# Patient Record
Sex: FEMALE | Race: WHITE | NOT HISPANIC OR LATINO
[De-identification: names, ages, dates, MRNs, and addresses within clinical notes are randomized per-mention and may not be internally consistent; named-entity substitution may affect disease eponyms.]

---

## 2019-03-06 ENCOUNTER — APPOINTMENT (OUTPATIENT)
Dept: NEUROSURGERY | Facility: CLINIC | Age: 69
End: 2019-03-06
Payer: COMMERCIAL

## 2019-03-06 VITALS
WEIGHT: 132 LBS | HEART RATE: 106 BPM | BODY MASS INDEX: 18.9 KG/M2 | SYSTOLIC BLOOD PRESSURE: 195 MMHG | DIASTOLIC BLOOD PRESSURE: 79 MMHG | HEIGHT: 70 IN

## 2019-03-06 PROCEDURE — 99204 OFFICE O/P NEW MOD 45 MIN: CPT

## 2019-03-06 NOTE — HISTORY OF PRESENT ILLNESS
[de-identified] : Waleska is a pleasant 68-year-old here for evaluation of left leg weakness. She states she's had back and leg pain for several months however is really noted that she is really unable to bear weight on the left leg for some time now. She relates with the assistance of a cane. She states that her left leg will buckle readily. She been worked up and undergone some pain management treatments along the way. She had an MRI of the lumbar spine performed medical arts radiology. She did get an injection to the spine which did not provide much relief. She saw Dr. Cain ulloa and had an EMG performed which showed severe lumbosacral radiculopathy without evidence of overt neuropathy. MRI and see her for my review. She has no complaints at that suggest cauda equina compression. Symptoms are almost all unilateral back and leg on the left down to the knee.She did  10 visits of physical therapy which she is trying to continue as her leg strength has been getting worse.

## 2019-03-06 NOTE — PLAN
[FreeTextEntry1] : This is a patient with severe left leg weakness with imaging the doesn't support a direct etiology. The grade 1 spondylolisthesis at L4-5 doesn't seem significant enough to cause his left leg to be atrophied and essentially we can to the point of being not even antigravity and power. She has significant quadriceps and iliopsoas weakness. MRI does not show stenosis at the upper levels and the narrowing at L4-5 level is also minimal. I'm concerned that there is a lumbar plexus issue or an issue more peripherally therefore recommended a pelvic MRI as well as CAT scan and flexion-extension views. There is highly unstable segment 45 which is not being seen on the MRI then this will be helpful to note. Her past for repeat MRI with contrast to rule out either intradural pathology or a primary neuropathology.  The question is whether or not this and the peripheral in the pelvis catching lumbar plexus outside of the spine.

## 2019-03-06 NOTE — RESULTS/DATA
[FreeTextEntry1] : MRI lumbar spine and medical arts shows a grade 1 spondylolisthesis which is about one or 2 mm at L4-5 there may be a spondylolysis. The rest of the lumbar spine is relatively unremarkable with mild changes only. There is no significant stenosis there is no large disc herniations noted. I can see that the left iliopsoas muscle there was significantly atrophied compared to the right.\par \par The EMG reveals significant changes that are suggestive of lumbosacral radiculopathy rather than neuropathy.

## 2019-03-06 NOTE — PHYSICAL EXAM
[5] : S1 flexor hallucis longus 5/5 [2] : L3 quadriceps 2/5 [3] : S1 flexor hallucis longus 3/5 [FreeTextEntry1] : Significant atrophy of the quadriceps on the LEFT

## 2019-03-06 NOTE — REASON FOR VISIT
[New Patient Visit] : a new patient visit [FreeTextEntry1] : Patient is here for Lower back pain.MRI and Emg done at [place].

## 2019-03-22 ENCOUNTER — APPOINTMENT (OUTPATIENT)
Dept: NEUROSURGERY | Facility: CLINIC | Age: 69
End: 2019-03-22
Payer: MEDICARE

## 2019-03-22 VITALS
SYSTOLIC BLOOD PRESSURE: 144 MMHG | WEIGHT: 132 LBS | HEIGHT: 70 IN | BODY MASS INDEX: 18.9 KG/M2 | DIASTOLIC BLOOD PRESSURE: 88 MMHG

## 2019-03-22 DIAGNOSIS — M54.9 DORSALGIA, UNSPECIFIED: ICD-10-CM

## 2019-03-22 PROCEDURE — 99214 OFFICE O/P EST MOD 30 MIN: CPT

## 2019-03-22 RX ORDER — OXYCODONE AND ACETAMINOPHEN 5; 325 MG/1; MG/1
5-325 TABLET ORAL
Qty: 30 | Refills: 0 | Status: ACTIVE | COMMUNITY
Start: 2019-03-18 | End: 1900-01-01

## 2019-03-22 NOTE — REASON FOR VISIT
[Follow-Up: _____] : a [unfilled] follow-up visit [Family Member] : family member [FreeTextEntry1] : Waleska is here for followup regarding her lumbar spine and left leg weakness.  She had CAT scan and MRI performed as well as electrodiagnostic studies.   She is here for review the results.   She's been falling more recently and now her son is been living with her.  She seems cachectic and admits that her nutrition is very poor.

## 2019-03-22 NOTE — PHYSICAL EXAM
[FreeTextEntry6] : Her left leg weakness is persistent. She has ileus psoas quadriceps weakness and atrophy. He less so as atrophy can be seen on MRI a lower and proximal leg atrophy is obvious on examination

## 2019-03-22 NOTE — ASSESSMENT
[FreeTextEntry1] : This is a patient with a mild/moderate spondylolisthesis at L4-5 with rather significant stenosis but not to the degree that I would expect this leg weakness.  A CAT scan certainly shows overgrowth of bone and facet arthropathy and seems to accentuate the stenosis more than an MRI that reviewed.  Will have the appearance is suggestive there is another issue going on I recommended she see her medical doctor be worked up.   Apparently she was in [facility] had a full workup performed without a true diagnosis being made.  I have told her about lumbar decompression fusion and even if she was a candidate her health would have to be good enough to sustain the stress of this operation.  She'll follow up with her medical team in my office to determine whether surgery is appropriate measure for her

## 2019-03-22 NOTE — RESULTS/DATA
[FreeTextEntry1] : Electrodiagnostic studies suggest lumbosacral radiculopathy.  Imaging studies performed show no evidence of tumor or neoplasm of significance to suggest a peripheral etiology.

## 2019-05-01 ENCOUNTER — OUTPATIENT (OUTPATIENT)
Dept: OUTPATIENT SERVICES | Facility: HOSPITAL | Age: 69
LOS: 1 days | End: 2019-05-01
Payer: MEDICARE

## 2019-05-01 PROCEDURE — 93010 ELECTROCARDIOGRAM REPORT: CPT

## 2019-05-07 ENCOUNTER — APPOINTMENT (OUTPATIENT)
Dept: ENDOCRINOLOGY | Facility: CLINIC | Age: 69
End: 2019-05-07
Payer: MEDICARE

## 2019-05-07 VITALS
BODY MASS INDEX: 16.03 KG/M2 | HEIGHT: 70 IN | DIASTOLIC BLOOD PRESSURE: 90 MMHG | HEART RATE: 98 BPM | WEIGHT: 112 LBS | SYSTOLIC BLOOD PRESSURE: 150 MMHG

## 2019-05-07 LAB — GLUCOSE BLDC GLUCOMTR-MCNC: 208

## 2019-05-07 PROCEDURE — 82962 GLUCOSE BLOOD TEST: CPT

## 2019-05-07 PROCEDURE — 99204 OFFICE O/P NEW MOD 45 MIN: CPT | Mod: 25

## 2019-05-08 ENCOUNTER — APPOINTMENT (OUTPATIENT)
Dept: ENDOCRINOLOGY | Facility: CLINIC | Age: 69
End: 2019-05-08
Payer: MEDICARE

## 2019-05-08 DIAGNOSIS — E11.9 TYPE 2 DIABETES MELLITUS W/OUT COMPLICATIONS: ICD-10-CM

## 2019-05-08 LAB — HBA1C MFR BLD HPLC: 10.2

## 2019-05-08 PROCEDURE — G0108 DIAB MANAGE TRN  PER INDIV: CPT

## 2019-05-08 NOTE — PHYSICAL EXAM
[Alert] : alert [No Acute Distress] : no acute distress [Well Developed] : well developed [Normal Sclera/Conjunctiva] : normal sclera/conjunctiva [Well Nourished] : well nourished [EOMI] : extra ocular movement intact [No Thyroid Nodules] : there were no palpable thyroid nodules [Thyroid Not Enlarged] : the thyroid was not enlarged [No Respiratory Distress] : no respiratory distress [No Accessory Muscle Use] : no accessory muscle use [Clear to Auscultation] : lungs were clear to auscultation bilaterally [Normal Rate] : heart rate was normal  [Regular Rhythm] : with a regular rhythm [Normal S1, S2] : normal S1 and S2 [Pedal Pulses Normal] : the pedal pulses are present [No Edema] : there was no peripheral edema [Post Cervical Nodes] : posterior cervical nodes [Anterior Cervical Nodes] : anterior cervical nodes [Normal] : normal and non tender [No Spinal Tenderness] : no spinal tenderness [No Stigmata of Cushings Syndrome] : no stigmata of cushings syndrome [Spine Straight] : spine straight [No Rash] : no rash [Normal Reflexes] : deep tendon reflexes were 2+ and symmetric [Oriented x3] : oriented to person, place, and time [No Tremors] : no tremors [Acanthosis Nigricans] : no acanthosis nigricans

## 2019-05-08 NOTE — REASON FOR VISIT
[Initial Eval - Existing Diagnosis] : an initial evaluation of an existing diagnosis [FreeTextEntry1] : uncontrolled DM  preop next week  for back surgery

## 2019-05-08 NOTE — ASSESSMENT
[FreeTextEntry1] : T2Dm uncontrolled  ? T1Dm\par  will check labs tomorrow  Cpeptide  JEN 65 Ab \par check LFT etc\par pt need to have surgery next week bc is in severe pain and cannot wait\par \par willstart insulin tomorrow- going to PMD right now \par will start 8 units Lantus/Levemir/Basaglar tomorrow with insulin teach \par will get BW coronado in AM check TFT LFT \par \par will need to call in numbers daily - to assess control\par  pt needs to eat- has restricted diet too much \par \par

## 2019-05-08 NOTE — HISTORY OF PRESENT ILLNESS
[FreeTextEntry1] : Pt here for initial eval of T2DM - pt seen urgently bc to have surgery next week- Dr Alexander at [facility] \par \par pt with h/o T2DM for 10 years - Pt had been tried on 2 meds MFN and another that began with a "g"- but pt becam sick - GI [facility]\par  tried to [facility] diet and exercies\par \par Pt shocked to find A1c is 10.% \par \par pt had stopped testing - got a new meter yesterday \par  BS yesteaay 230-300\par today BS down to low 200 but pt only dinking protien shakes \par \par Pt had deveoped back trouble in [place]- has been doing Pt without relief \par \par pt hinks high A1c was because over the past month - was eating a bit more breads

## 2019-05-15 ENCOUNTER — INPATIENT (INPATIENT)
Facility: HOSPITAL | Age: 69
LOS: 2 days | Discharge: NURSING, REHAB OR LONG-TERM CARE | End: 2019-05-18
Attending: NEUROLOGICAL SURGERY | Admitting: NEUROLOGICAL SURGERY

## 2019-05-15 ENCOUNTER — OUTPATIENT (OUTPATIENT)
Dept: OUTPATIENT SERVICES | Facility: HOSPITAL | Age: 69
LOS: 1 days | End: 2019-05-15

## 2019-05-15 ENCOUNTER — APPOINTMENT (OUTPATIENT)
Dept: NEUROSURGERY | Facility: HOSPITAL | Age: 69
End: 2019-05-15
Payer: MEDICARE

## 2019-05-15 PROCEDURE — 22633 ARTHRD CMBN 1NTRSPC LUMBAR: CPT

## 2019-05-15 PROCEDURE — 22853 INSJ BIOMECHANICAL DEVICE: CPT

## 2019-05-15 PROCEDURE — 11981 INSERTION DRUG DLVR IMPLANT: CPT

## 2019-05-15 PROCEDURE — 22840 INSERT SPINE FIXATION DEVICE: CPT

## 2019-05-16 ENCOUNTER — OUTPATIENT (OUTPATIENT)
Dept: OUTPATIENT SERVICES | Facility: HOSPITAL | Age: 69
LOS: 1 days | End: 2019-05-16

## 2019-05-17 ENCOUNTER — OUTPATIENT (OUTPATIENT)
Dept: OUTPATIENT SERVICES | Facility: HOSPITAL | Age: 69
LOS: 1 days | End: 2019-05-17

## 2019-05-18 ENCOUNTER — INPATIENT (INPATIENT)
Facility: HOSPITAL | Age: 69
LOS: 19 days | Discharge: HOME | End: 2019-06-07
Attending: INTERNAL MEDICINE | Admitting: INTERNAL MEDICINE
Payer: MEDICARE

## 2019-05-19 PROCEDURE — 73630 X-RAY EXAM OF FOOT: CPT | Mod: 26,LT

## 2019-05-19 PROCEDURE — 73562 X-RAY EXAM OF KNEE 3: CPT | Mod: 26,LT,76

## 2019-05-19 PROCEDURE — 73610 X-RAY EXAM OF ANKLE: CPT | Mod: 26,LT

## 2019-05-20 ENCOUNTER — OUTPATIENT (OUTPATIENT)
Dept: OUTPATIENT SERVICES | Facility: HOSPITAL | Age: 69
LOS: 1 days | End: 2019-05-20

## 2019-06-06 ENCOUNTER — OUTPATIENT (OUTPATIENT)
Dept: OUTPATIENT SERVICES | Facility: HOSPITAL | Age: 69
LOS: 1 days | End: 2019-06-06

## 2019-06-10 ENCOUNTER — APPOINTMENT (OUTPATIENT)
Dept: NEUROSURGERY | Facility: CLINIC | Age: 69
End: 2019-06-10
Payer: MEDICARE

## 2019-06-10 VITALS
HEIGHT: 70 IN | WEIGHT: 119 LBS | BODY MASS INDEX: 17.04 KG/M2 | DIASTOLIC BLOOD PRESSURE: 50 MMHG | SYSTOLIC BLOOD PRESSURE: 98 MMHG

## 2019-06-10 PROCEDURE — 99024 POSTOP FOLLOW-UP VISIT: CPT

## 2019-06-10 RX ORDER — OXYCODONE AND ACETAMINOPHEN 5; 325 MG/1; MG/1
5-325 TABLET ORAL
Qty: 40 | Refills: 0 | Status: ACTIVE | COMMUNITY
Start: 2019-06-10 | End: 1900-01-01

## 2019-06-10 NOTE — ASSESSMENT
[FreeTextEntry1] : Mrs. Day presents to the office today one month status post L4-5 decompressive laminectomy and fusion. She is doing extremely well. She should continue to walk with the cane as much as possible. She should continue with physical therapy to work on strengthening her lower extremities. She should avoid submerging the wound in water or applying any ointment to the wound until it has completely healed. Xrays performed in the office already reveal evidence of fusion. She should start with antiinflammatories twice daily and I have renewed her Percocet to take as needed for severe pain. We will have her back in a month to reevaluate her progress.

## 2019-06-10 NOTE — PHYSICAL EXAM
[General Appearance - Alert] : alert [General Appearance - In No Acute Distress] : in no acute distress [General Appearance - Well Developed] : well developed [General Appearance - Well Nourished] : well nourished [General Appearance - Well-Appearing] : healthy appearing [] : normal voice and communication [Longitudinal] : longitudinal [Person] : oriented to person [Place] : oriented to place [Time] : oriented to time [Motor Tone] : muscle tone was normal in all four extremities [Involuntary Movements] : no involuntary movements were seen [Limited Balance] : the patient's balance was impaired [No Muscle Atrophy] : normal bulk in all four extremities [Abnormal Walk] : normal gait [5] : 5/5 Ankle Plantar Flexion (S1) [2] : 2/5 Knee Extensor (L3) [3] : 3/5 Great Toe Extension (L5) [4] : 4/5 Ankle Plantar Flexion (S1) [FreeTextEntry1] : lumbar region [FreeTextEntry6] : +scab to the top of the wound today [FreeTextEntry8] : +ambulating with cane

## 2019-06-10 NOTE — DATA REVIEWED
[de-identified] : lumbar xrays performed in the office today reveal pedicle screws in at L4 and L5 with interconnecting rods and an intervertebral spacer. There is evidence of fusion laterally.

## 2019-06-10 NOTE — HISTORY OF PRESENT ILLNESS
[FreeTextEntry1] : Mrs. Day presents to the office today one month status post L4-5 decompressive laminectomy and fusion.She is doing relatively well. She reports significant improvement in the strength of her left leg. She can now lift it off the ground without any assistance. She is ambulating today with a cane. She was at the SNF for 23 days and was receiving PT twice daily. She was discharged home on Friday. She is taking the oxycodone as needed for severe pain.

## 2019-07-16 ENCOUNTER — APPOINTMENT (OUTPATIENT)
Dept: NEUROSURGERY | Facility: CLINIC | Age: 69
End: 2019-07-16

## 2019-07-23 ENCOUNTER — APPOINTMENT (OUTPATIENT)
Dept: NEUROSURGERY | Facility: CLINIC | Age: 69
End: 2019-07-23
Payer: MEDICARE

## 2019-07-23 VITALS
SYSTOLIC BLOOD PRESSURE: 145 MMHG | DIASTOLIC BLOOD PRESSURE: 80 MMHG | WEIGHT: 119 LBS | HEIGHT: 70 IN | BODY MASS INDEX: 17.04 KG/M2

## 2019-07-23 PROCEDURE — 99024 POSTOP FOLLOW-UP VISIT: CPT

## 2019-07-23 NOTE — PHYSICAL EXAM
[General Appearance - Alert] : alert [General Appearance - In No Acute Distress] : in no acute distress [] : normal voice and communication [Longitudinal] : longitudinal [Clean] : clean [Well-Healed] : well-healed [No Drainage] : without drainage [Normal Skin] : normal [Affect] : the affect was normal [Normal Skin Turgor] : skin turgor was normal [Oriented To Time, Place, And Person] : oriented to person, place, and time [Memory Recent] : recent memory was not impaired [Mood] : the mood was normal [Person] : oriented to person [Place] : oriented to place [Time] : oriented to time [Cranial Nerves Optic (II)] : visual acuity intact bilaterally,  pupils equal round and reactive to light [Cranial Nerves Oculomotor (III)] : extraocular motion intact [Cranial Nerves Facial (VII)] : face symmetrical [Cranial Nerves Vestibulocochlear (VIII)] : hearing was intact bilaterally [Cranial Nerves Glossopharyngeal (IX)] : tongue and palate midline [Cranial Nerves Accessory (XI - Cranial And Spinal)] : head turning and shoulder shrug symmetric [Cranial Nerves Hypoglossal (XII)] : there was no tongue deviation with protrusion [Intact] : all motor groups within normal limits of strength and tone bilaterally [Involuntary Movements] : no involuntary movements were seen [Tender] : not tender [Erythema] : not erythematous [Warm] : not warm [Indurated] : not indurated [Fluctuant] : not fluctuant [FreeTextEntry5] : 4/5 motor - LLE (quad, hamstring, iliopsoas) [FreeTextEntry1] : Ambulating with a cane.

## 2019-07-23 NOTE — ASSESSMENT
[FreeTextEntry1] : Discussed the history, physical examination findings, & recent surgery with the patient with all questions answered. The patient demonstrates clinical improvement following the decompressive laminectomy & fusion. Continue formal physical therapy & modified activity as tolerated. The patient will follow-up at the next office visit for repeat radiographs in 4-6 weeks

## 2019-07-23 NOTE — REASON FOR VISIT
[de-identified] : L4-5 decompressive laminectomy and fusion [de-identified] : 5/15/2019 [de-identified] : PT FEELS SHE IS IMPROVING EVERY DAY, SHE HAS DISCONTINUED TAKING PAIN MEDICATION AND SHE SAYS PHYSICAL THERAPY IS GOING WELL

## 2019-07-23 NOTE — HISTORY OF PRESENT ILLNESS
[FreeTextEntry1] : 68 y/o female presents for follow-up evaluation s/p L4-5 decompressive laminectomy and fusion. The patient underwent the procedure & continued her post-op course in the SNF at [facility]. She is currently at home & going to formal physical therapy. The patient is currently ambulating with a cane as needed. She reports that she has not required any narcotic medication for her pain. She is happy with her symptom improvement & has no new complaints at the present time.

## 2019-08-19 ENCOUNTER — APPOINTMENT (OUTPATIENT)
Dept: NEUROSURGERY | Facility: CLINIC | Age: 69
End: 2019-08-19

## 2019-09-04 ENCOUNTER — APPOINTMENT (OUTPATIENT)
Dept: NEUROSURGERY | Facility: CLINIC | Age: 69
End: 2019-09-04
Payer: MEDICARE

## 2019-09-04 VITALS
SYSTOLIC BLOOD PRESSURE: 132 MMHG | DIASTOLIC BLOOD PRESSURE: 77 MMHG | BODY MASS INDEX: 17.04 KG/M2 | HEIGHT: 70 IN | WEIGHT: 119 LBS

## 2019-09-04 DIAGNOSIS — R29.898 OTHER SYMPTOMS AND SIGNS INVOLVING THE MUSCULOSKELETAL SYSTEM: ICD-10-CM

## 2019-09-04 PROCEDURE — 99213 OFFICE O/P EST LOW 20 MIN: CPT

## 2019-09-04 NOTE — PHYSICAL EXAM
[General Appearance - Alert] : alert [General Appearance - Well Nourished] : well nourished [General Appearance - In No Acute Distress] : in no acute distress [General Appearance - Well Developed] : well developed [General Appearance - Well-Appearing] : healthy appearing [] : normal voice and communication [Longitudinal] : longitudinal [Dry] : dry [Clean] : clean [Well-Healed] : well-healed [Intact] : intact [No Drainage] : without drainage [Person] : oriented to person [Motor Tone] : muscle tone was normal in all four extremities [Place] : oriented to place [Time] : oriented to time [No Muscle Atrophy] : normal bulk in all four extremities [Involuntary Movements] : no involuntary movements were seen [2] : L3 quadriceps 2/5 [Limited Balance] : the patient's balance was impaired [Abnormal Walk] : normal gait [5] : 5/5 Ankle Plantar Flexion (S1) [3] : 3/5 Ankle Dorsiflexor (L4) [4] : 4/5 Ankle Plantar Flexion (S1) [FreeTextEntry1] : lumbar region [FreeTextEntry6] : +scab to the top of the wound today [FreeTextEntry8] : +ambulating with cane

## 2019-09-04 NOTE — REASON FOR VISIT
[Follow-Up: _____] : a [unfilled] follow-up visit [FreeTextEntry1] : 69-year-old female presents to the office for her followup evaluation. The patient underwent L4-5 laminectomy with interbody infusion on 5/15/2019 at [facility].  The patient reports that she is doing well. She is currently going to formal physical therapy 2-3 times per week. She notes continued weakness in her left lower extremity, although improved from her preoperative symptoms. She has no new complaints at present.

## 2019-09-04 NOTE — ASSESSMENT
[FreeTextEntry1] : Discussed the history, physical examination findings, and recent imaging with the patient. The radiographs demonstrate good anatomic alignment. Patient reports that she is doing well nearly 4 months postoperatively. She still demonstrates left lower extremity weakness, though improved. Advised to continue formal physical therapy. The patient will followup at 6 months postop to assess for continued clinical improvement

## 2019-09-04 NOTE — DATA REVIEWED
[de-identified] : Obtained in the office today 9/4/2019 of the lumbar spine AP/lateral: Intact hardware with good anatomic alignment status post L4-5 laminectomy with interbody and fusion.

## 2019-09-25 ENCOUNTER — APPOINTMENT (OUTPATIENT)
Dept: ENDOCRINOLOGY | Facility: CLINIC | Age: 69
End: 2019-09-25

## 2019-12-03 ENCOUNTER — RX RENEWAL (OUTPATIENT)
Age: 69
End: 2019-12-03

## 2019-12-03 RX ORDER — PEN NEEDLE, DIABETIC 29 G X1/2"
32G X 4 MM NEEDLE, DISPOSABLE MISCELLANEOUS
Qty: 100 | Refills: 1 | Status: ACTIVE | COMMUNITY
Start: 2019-05-08 | End: 1900-01-01

## 2020-01-03 ENCOUNTER — RX RENEWAL (OUTPATIENT)
Age: 70
End: 2020-01-03

## 2020-01-03 ENCOUNTER — APPOINTMENT (OUTPATIENT)
Dept: NEUROSURGERY | Facility: CLINIC | Age: 70
End: 2020-01-03
Payer: MEDICARE

## 2020-01-03 VITALS
HEART RATE: 80 BPM | DIASTOLIC BLOOD PRESSURE: 77 MMHG | HEIGHT: 70 IN | SYSTOLIC BLOOD PRESSURE: 130 MMHG | BODY MASS INDEX: 17.04 KG/M2 | WEIGHT: 119 LBS

## 2020-01-03 DIAGNOSIS — M43.17 SPONDYLOLISTHESIS, LUMBOSACRAL REGION: ICD-10-CM

## 2020-01-03 DIAGNOSIS — Z98.1 ARTHRODESIS STATUS: ICD-10-CM

## 2020-01-03 DIAGNOSIS — Z87.891 PERSONAL HISTORY OF NICOTINE DEPENDENCE: ICD-10-CM

## 2020-01-03 DIAGNOSIS — M43.10 SPONDYLOLISTHESIS, SITE UNSPECIFIED: ICD-10-CM

## 2020-01-03 PROCEDURE — 99214 OFFICE O/P EST MOD 30 MIN: CPT

## 2020-01-03 RX ORDER — INSULIN GLARGINE 100 [IU]/ML
100 INJECTION, SOLUTION SUBCUTANEOUS DAILY
Qty: 1 | Refills: 0 | Status: ACTIVE | COMMUNITY
Start: 2019-05-08 | End: 1900-01-01

## 2020-01-03 NOTE — HISTORY OF PRESENT ILLNESS
[FreeTextEntry1] : 69-year-old female presents to the office for her followup evaluation. The patient underwent L4-5 laminectomy with interbody infusion on 5/15/2019 at [facility] [place].  The patient reports that she is doing well. the patient reports little to no back pain. The patient reports that her leg weakness has improved, however not 100%. The patient is hoping to increase her physical activity and has met with a  to start a gym program. She has no new complaints at present.

## 2020-01-03 NOTE — PHYSICAL EXAM
[General Appearance - Alert] : alert [General Appearance - Well Nourished] : well nourished [General Appearance - In No Acute Distress] : in no acute distress [General Appearance - Well-Appearing] : healthy appearing [General Appearance - Well Developed] : well developed [] : normal voice and communication [Dry] : dry [Longitudinal] : longitudinal [Clean] : clean [Intact] : intact [No Drainage] : without drainage [Well-Healed] : well-healed [FreeTextEntry1] : lumbar region [Oriented To Time, Place, And Person] : oriented to person, place, and time [Impaired Insight] : insight and judgment were intact [Affect] : the affect was normal [Mood] : the mood was normal [Memory Recent] : recent memory was not impaired [Person] : oriented to person [Place] : oriented to place [Time] : oriented to time [Motor Strength] : muscle strength was normal in all four extremities [Motor Tone] : muscle tone was normal in all four extremities [Involuntary Movements] : no involuntary movements were seen [No Muscle Atrophy] : normal bulk in all four extremities [Abnormal Walk] : normal gait [Limited Balance] : the patient's balance was impaired [FreeTextEntry8] : +ambulating with cane [Normal] : normal [5] : 5/5 Ankle Plantar Flexion (S1) [3] : 3/5 Great Toe Extension (L5) [4] : 4/5 Ankle Plantar Flexion (S1)

## 2020-01-03 NOTE — ASSESSMENT
[FreeTextEntry1] : Discussed the history, physical examination findings, and previous surgery with the patient with all questions answered. The patient is doing well. Discussed that the patient may participate in gym activity and continue formal physical therapy as tolerated. She will followup in April/May of 2020 for the one year postoperative evaluation.

## 2020-06-30 ENCOUNTER — APPOINTMENT (OUTPATIENT)
Dept: NEUROSURGERY | Facility: CLINIC | Age: 70
End: 2020-06-30

## 2024-01-27 NOTE — DATA REVIEWED
[de-identified] : Obtained in the office today 9/4/2019 of the lumbar spine AP/lateral: Intact hardware with good anatomic alignment status post L4-5 laminectomy with interbody and fusion.\par Were reviewed of the lumbar spine - 1/3/2020: Intact with good anatomic alignment status post L4-5 decompression laminectomy with interbody & fusion Sepsis